# Patient Record
Sex: MALE | NOT HISPANIC OR LATINO | Employment: FULL TIME | URBAN - METROPOLITAN AREA
[De-identification: names, ages, dates, MRNs, and addresses within clinical notes are randomized per-mention and may not be internally consistent; named-entity substitution may affect disease eponyms.]

---

## 2019-11-15 ENCOUNTER — OFFICE VISIT (OUTPATIENT)
Dept: URGENT CARE | Facility: CLINIC | Age: 24
End: 2019-11-15
Payer: COMMERCIAL

## 2019-11-15 ENCOUNTER — HOSPITAL ENCOUNTER (EMERGENCY)
Facility: MEDICAL CENTER | Age: 24
End: 2019-11-15
Attending: EMERGENCY MEDICINE
Payer: COMMERCIAL

## 2019-11-15 ENCOUNTER — APPOINTMENT (OUTPATIENT)
Dept: RADIOLOGY | Facility: MEDICAL CENTER | Age: 24
End: 2019-11-15
Attending: EMERGENCY MEDICINE
Payer: COMMERCIAL

## 2019-11-15 VITALS
OXYGEN SATURATION: 97 % | WEIGHT: 141.76 LBS | BODY MASS INDEX: 21 KG/M2 | RESPIRATION RATE: 16 BRPM | TEMPERATURE: 97.2 F | HEART RATE: 89 BPM | SYSTOLIC BLOOD PRESSURE: 120 MMHG | HEIGHT: 69 IN | DIASTOLIC BLOOD PRESSURE: 80 MMHG

## 2019-11-15 VITALS
DIASTOLIC BLOOD PRESSURE: 64 MMHG | SYSTOLIC BLOOD PRESSURE: 110 MMHG | OXYGEN SATURATION: 95 % | TEMPERATURE: 98.3 F | WEIGHT: 132.7 LBS | HEART RATE: 93 BPM | BODY MASS INDEX: 19.65 KG/M2 | RESPIRATION RATE: 18 BRPM | HEIGHT: 69 IN

## 2019-11-15 DIAGNOSIS — S60.811A: ICD-10-CM

## 2019-11-15 DIAGNOSIS — K52.9 COLITIS: ICD-10-CM

## 2019-11-15 DIAGNOSIS — K62.5 RECTAL BLEEDING: ICD-10-CM

## 2019-11-15 DIAGNOSIS — R10.30 LOWER ABDOMINAL PAIN: ICD-10-CM

## 2019-11-15 DIAGNOSIS — Z23 NEED FOR TETANUS BOOSTER: ICD-10-CM

## 2019-11-15 LAB
ALBUMIN SERPL BCP-MCNC: 5.3 G/DL (ref 3.2–4.9)
ALBUMIN/GLOB SERPL: 1.8 G/DL
ALP SERPL-CCNC: 54 U/L (ref 30–99)
ALT SERPL-CCNC: 13 U/L (ref 2–50)
ANION GAP SERPL CALC-SCNC: 8 MMOL/L (ref 0–11.9)
APPEARANCE UR: CLEAR
AST SERPL-CCNC: 20 U/L (ref 12–45)
BASOPHILS # BLD AUTO: 0.8 % (ref 0–1.8)
BASOPHILS # BLD: 0.04 K/UL (ref 0–0.12)
BILIRUB SERPL-MCNC: 1.5 MG/DL (ref 0.1–1.5)
BILIRUB UR QL STRIP.AUTO: NEGATIVE
BUN SERPL-MCNC: 12 MG/DL (ref 8–22)
CALCIUM SERPL-MCNC: 10.2 MG/DL (ref 8.5–10.5)
CHLORIDE SERPL-SCNC: 103 MMOL/L (ref 96–112)
CO2 SERPL-SCNC: 28 MMOL/L (ref 20–33)
COLOR UR: YELLOW
CREAT SERPL-MCNC: 1.16 MG/DL (ref 0.5–1.4)
EOSINOPHIL # BLD AUTO: 0.11 K/UL (ref 0–0.51)
EOSINOPHIL NFR BLD: 2.3 % (ref 0–6.9)
ERYTHROCYTE [DISTWIDTH] IN BLOOD BY AUTOMATED COUNT: 42.3 FL (ref 35.9–50)
GLOBULIN SER CALC-MCNC: 2.9 G/DL (ref 1.9–3.5)
GLUCOSE SERPL-MCNC: 95 MG/DL (ref 65–99)
GLUCOSE UR STRIP.AUTO-MCNC: NEGATIVE MG/DL
HCT VFR BLD AUTO: 51.4 % (ref 42–52)
HGB BLD-MCNC: 17.5 G/DL (ref 14–18)
IMM GRANULOCYTES # BLD AUTO: 0.01 K/UL (ref 0–0.11)
IMM GRANULOCYTES NFR BLD AUTO: 0.2 % (ref 0–0.9)
KETONES UR STRIP.AUTO-MCNC: 15 MG/DL
LEUKOCYTE ESTERASE UR QL STRIP.AUTO: NEGATIVE
LIPASE SERPL-CCNC: 27 U/L (ref 11–82)
LYMPHOCYTES # BLD AUTO: 2.04 K/UL (ref 1–4.8)
LYMPHOCYTES NFR BLD: 42 % (ref 22–41)
MCH RBC QN AUTO: 29.1 PG (ref 27–33)
MCHC RBC AUTO-ENTMCNC: 34 G/DL (ref 33.7–35.3)
MCV RBC AUTO: 85.4 FL (ref 81.4–97.8)
MICRO URNS: ABNORMAL
MONOCYTES # BLD AUTO: 0.53 K/UL (ref 0–0.85)
MONOCYTES NFR BLD AUTO: 10.9 % (ref 0–13.4)
NEUTROPHILS # BLD AUTO: 2.13 K/UL (ref 1.82–7.42)
NEUTROPHILS NFR BLD: 43.8 % (ref 44–72)
NITRITE UR QL STRIP.AUTO: NEGATIVE
NRBC # BLD AUTO: 0 K/UL
NRBC BLD-RTO: 0 /100 WBC
PH UR STRIP.AUTO: 7 [PH] (ref 5–8)
PLATELET # BLD AUTO: 195 K/UL (ref 164–446)
PMV BLD AUTO: 11.2 FL (ref 9–12.9)
POTASSIUM SERPL-SCNC: 4 MMOL/L (ref 3.6–5.5)
PROT SERPL-MCNC: 8.2 G/DL (ref 6–8.2)
PROT UR QL STRIP: NEGATIVE MG/DL
RBC # BLD AUTO: 6.02 M/UL (ref 4.7–6.1)
RBC UR QL AUTO: NEGATIVE
SODIUM SERPL-SCNC: 139 MMOL/L (ref 135–145)
SP GR UR STRIP.AUTO: 1.02
UROBILINOGEN UR STRIP.AUTO-MCNC: 1 MG/DL
WBC # BLD AUTO: 4.9 K/UL (ref 4.8–10.8)

## 2019-11-15 PROCEDURE — 90715 TDAP VACCINE 7 YRS/> IM: CPT | Performed by: NURSE PRACTITIONER

## 2019-11-15 PROCEDURE — 80053 COMPREHEN METABOLIC PANEL: CPT

## 2019-11-15 PROCEDURE — 85025 COMPLETE CBC W/AUTO DIFF WBC: CPT

## 2019-11-15 PROCEDURE — 700117 HCHG RX CONTRAST REV CODE 255: Performed by: EMERGENCY MEDICINE

## 2019-11-15 PROCEDURE — 83690 ASSAY OF LIPASE: CPT

## 2019-11-15 PROCEDURE — 81003 URINALYSIS AUTO W/O SCOPE: CPT

## 2019-11-15 PROCEDURE — 99284 EMERGENCY DEPT VISIT MOD MDM: CPT

## 2019-11-15 PROCEDURE — 99204 OFFICE O/P NEW MOD 45 MIN: CPT | Mod: 25 | Performed by: NURSE PRACTITIONER

## 2019-11-15 PROCEDURE — 74177 CT ABD & PELVIS W/CONTRAST: CPT

## 2019-11-15 PROCEDURE — 90471 IMMUNIZATION ADMIN: CPT | Performed by: NURSE PRACTITIONER

## 2019-11-15 RX ORDER — HYDROCORTISONE ACETATE 25 MG/1
25 SUPPOSITORY RECTAL EVERY 12 HOURS
Qty: 6 SUPPOSITORY | Refills: 0 | Status: SHIPPED | OUTPATIENT
Start: 2019-11-15 | End: 2019-11-18

## 2019-11-15 RX ORDER — SULFAMETHOXAZOLE AND TRIMETHOPRIM 800; 160 MG/1; MG/1
1 TABLET ORAL 2 TIMES DAILY
Qty: 10 TAB | Refills: 0 | Status: SHIPPED | OUTPATIENT
Start: 2019-11-15 | End: 2019-11-20

## 2019-11-15 RX ADMIN — IOHEXOL 80 ML: 350 INJECTION, SOLUTION INTRAVENOUS at 20:48

## 2019-11-15 NOTE — LETTER
November 15, 2019         Patient: Carlo Adams   YOB: 1995   Date of Visit: 11/15/2019           To Whom it May Concern:    Carlo Adams was seen in my clinic on 11/15/2019. He may be excused from work today and tomorrow.     If you have any questions or concerns, please don't hesitate to call.        Sincerely,           NABEEL Espinosa.  Electronically Signed

## 2019-11-16 NOTE — ED PROVIDER NOTES
"ED Provider Note    Scribed for Jerardo Mendosa M.D. by Jerardo Mendosa. 11/15/2019,  8:06 PM.    CHIEF COMPLAINT  Chief Complaint   Patient presents with   • Bloody Stools     Pt reports blood in stool 2 days ago, dark/tarry in nature. pt explains hx of fissures   • Abdominal Pain       HPI  Carlo Adams is a 24 y.o. male who presents to the Emergency Department for recurrent bloody stools.  He says that he is had these symptoms intermittently since December, when he saw GI consultants, who recommended a colonoscopy.  He ended up having a colonoscopy in [facility], and he has a report with him, which showed no significant findings other than a small fissure.  He denies fevers or chills or nausea or vomiting, but there is a slightly different symptom today which is crampy lower abdominal pain.  He has not had fevers or chills, or constipation or diarrhea.  He does not abuse alcohol, is not on blood thinners, and has no history of easy bleeding or bruising.      REVIEW OF SYSTEMS  See HPI for further details. All other systems are negative.     PAST MEDICAL HISTORY   Anal fissure.    SOCIAL HISTORY  Social History     Tobacco Use   • Smoking status: Never Smoker   • Smokeless tobacco: Never Used   Substance and Sexual Activity   • Alcohol use: Not on file   • Drug use: Not on file   • Sexual activity: Not on file     Social History     Substance and Sexual Activity   Drug Use Not on file       SURGICAL HISTORY  patient denies any surgical history    CURRENT MEDICATIONS  Home Medications    **Home medications have not yet been reviewed for this encounter**         ALLERGIES  No Known Allergies    PHYSICAL EXAM  VITAL SIGNS: /80   Pulse 89   Temp 36.2 °C (97.2 °F)   Resp 16   Ht 1.753 m (5' 9\")   Wt 64.3 kg (141 lb 12.1 oz)   SpO2 97%   BMI 20.93 kg/m²   Pulse ox interpretation: I interpret this pulse ox as normal.  Constitutional: Alert in no apparent distress.  HENT: No signs of trauma, Bilateral " external ears normal, Nose normal.   Eyes: Conjunctiva normal, Non-icteric.   Neck: Normal range of motion, Supple, No stridor.   Lymphatic: No lymphadenopathy noted.   Cardiovascular: Regular rate and rhythm, no murmurs.   Thorax & Lungs: Normal breath sounds, No respiratory distress, No wheezing, No chest tenderness.   Abdomen: Bowel sounds normal, Soft, slight central lower abdominal tenderness, No masses, No pulsatile masses. No peritoneal signs.  Skin: Warm, Dry, No erythema, No rash.   Extremities: Intact distal pulses, No edema, No cyanosis.  Musculoskeletal: Good range of motion in all major joints. No or major deformities noted.   Neurologic: Alert , Normal motor function, Normal sensory function, No focal deficits noted.   Psychiatric: Affect normal, Judgment normal, Mood normal.     DIAGNOSTIC STUDIES / PROCEDURES      LABS  Labs Reviewed   CBC WITH DIFFERENTIAL - Abnormal; Notable for the following components:       Result Value    Neutrophils-Polys 43.80 (*)     Lymphocytes 42.00 (*)     All other components within normal limits   COMP METABOLIC PANEL - Abnormal; Notable for the following components:    Albumin 5.3 (*)     All other components within normal limits   URINALYSIS,CULTURE IF INDICATED - Abnormal; Notable for the following components:    Ketones 15 (*)     All other components within normal limits   LIPASE   ESTIMATED GFR     All labs reviewed by me.    RADIOLOGY  CT-ABDOMEN-PELVIS WITH   Final Result      1.  2.3 cm segment of focal mucosal enhancement and mucosal thickening in the upper rectum which may represent area of focal colitis.      2.  No evidence of pericolonic abscess or bowel obstruction. Progress no evidence of small bowel obstruction or inflammation.      3.  Otherwise negative CT examination.           The radiologist's interpretation of all radiological studies have been reviewed by me.    COURSE & MEDICAL DECISION MAKING  Nursing notes, VS, PMSFHx reviewed in chart.      8:06 PM Patient seen and examined at bedside.  His labs, ordered in triage per protocol, show hemoglobin and hematocrit on the high end of normal, without any evidence of significant blood loss.  We know that he has a history of an anal fissure, but his new symptom is lower abdominal pain.  Though he does not show any leukocytosis or abnormal vital signs consistent with sepsis, I think it is reasonable, given the new symptoms, to order a CT scan of his belly.      9:49 PM this patient CT scan shows a small area of focal mucosal enhancement and thickening in the upper rectum, which may be focal colitis.  Given the bloody stool, which may be an indicator of bacterial infection, think it is reasonable to treat him with antibiotics.  Additionally, given his history of anal fissure, and the inflammatory change in his rectum, I think some steroid suppositories may be helpful as well.  The patient and I discussed this at the bedside, and I recommended that he follow-up with the GI clinic he was seen at in December, and he was also given primary care clinic information and return precautions for the emergency department.     The patient will return for new or worsening symptoms and is stable at the time of discharge.    The patient is referred to a primary physician for blood pressure management, diabetic screening, and for all other preventative health concerns.        DISPOSITION:  Patient will be discharged home in stable condition.    FOLLOW UP:  [facility]  [address] [facility] [id number] [phone]  Schedule an appointment as soon as possible for a visit       [facility] [address]  [phone]    Schedule an appointment as soon as possible for a visit   for primary care    [address] [id number] [phone]    for primary care    [address] [address]  [phone]    for  primary care    [facility], Emergency Dept  [address] [facility]  [place] [id number] [phone]    If symptoms worsen      OUTPATIENT MEDICATIONS:  Discharge Medication List as of 11/15/2019  9:35 PM      START taking these medications    Details   sulfamethoxazole-trimethoprim (BACTRIM DS) 800-160 MG tablet Take 1 Tab by mouth 2 times a day for 5 days., Disp-10 Tab, R-0, Normal      hydrocortisone (ANUSOL-HC) 25 MG Suppos Insert 1 Suppository in rectum every 12 hours for 3 days., Disp-6 Suppository, R-0, Normal               FINAL IMPRESSION  1. Colitis

## 2019-11-16 NOTE — ED TRIAGE NOTES
Chief Complaint   Patient presents with   • Bloody Stools     Pt reports blood in stool 2 days ago, dark/tarry in nature. pt explains hx of fissures   • Abdominal Pain     Explained to pt triage process, made pt aware to tell this RN/staff of any changes/concerns, pt verbalized understanding of process and instructions given. Pt to ER prabhakar.

## 2019-11-16 NOTE — ED NOTES
Pt ambulatory to ortho room.  Agree with triage rn.  Pt states longer hx of bloody stools, increasing over last two days.

## 2019-11-16 NOTE — PROGRESS NOTES
Chief Complaint   Patient presents with   • GI Problem     x 2 day poopng blood, pts has been seen previously for same issue back in [facility] but patient did not cmplete all requested tests        HISTORY OF PRESENT ILLNESS: Patient is a 24 y.o. male who presents to urgent care today with complaints of rectal bleeding. Notes that for the past two days he has had bleeding from his rectum, needing several underwear changes. The bleeding is not directly related to bowel movements but is draining from rectum, dark and bright red. He admits to associated weakness, lower abdominal pain, and fatigue. Denies fever, diarrhea. Admits to similar episode in January in [place], he had a colonoscopy but cannot recall results. Furthermore he is requesting a tetanus booster. Notes he accidentally scraped his wrist yesterday, causing a scratch. He cannot recall his last booster.       There are no active problems to display for this patient.      Allergies:Patient has no known allergies.    No current [facility]-ordered outpatient medications on file.     No current [facility]-ordered facility-administered medications on file.        History reviewed. No pertinent past medical history.    Social History     Tobacco Use   • Smoking status: Never Smoker   • Smokeless tobacco: Never Used   Substance Use Topics   • Alcohol use: Not on file   • Drug use: Not on file       No family status information on file.   History reviewed. No pertinent family history.    ROS:  Review of Systems   Constitutional: Negative for fever, chills, weight loss, malaise, and fatigue.   HENT: Negative for ear pain, nosebleeds, congestion, sore throat and neck pain.    Eyes: Negative for vision changes.   Neuro: Negative for headache, sensory changes, weakness, seizure, LOC.   Cardiovascular: Negative for chest pain, palpitations, orthopnea and leg swelling.   Respiratory: Negative for cough, sputum production, shortness of breath and wheezing.   Gastrointestinal: Positive for  "abdominal pain, rectal bleeding. Negative for nausea, vomiting or diarrhea.   Genitourinary: Negative for dysuria, urgency and frequency.  Musculoskeletal: Negative for falls, neck pain, back pain, joint pain, myalgias.   Skin: Positive for scratch. Negative for rash, diaphoresis.     Exam:  /64   Pulse 93   Temp 36.8 °C (98.3 °F) (Temporal)   Resp 18   Ht 1.753 m (5' 9\")   Wt 60.2 kg (132 lb 11.2 oz)   SpO2 95%   General: well-nourished, well-developed male in NAD  Head: normocephalic, atraumatic  Eyes: PERRLA, no conjunctival injection, acuity grossly intact, lids normal.  Ears: normal shape and symmetry, no tenderness, no discharge. External canals are without any significant edema or erythema. Tympanic membranes are without any inflammation, no effusion. Gross auditory acuity is intact.  Nose: symmetrical without tenderness, no discharge.  Mouth/Throat: reasonable hygiene, no erythema, exudates or tonsillar enlargement.  Neck: no masses, range of motion within normal limits, no tracheal deviation. No obvious thyroid enlargement.   Lymph: no cervical adenopathy. No supraclavicular adenopathy.   Neuro: alert and oriented. Cranial nerves 1-12 grossly intact. No sensory deficit.   Cardiovascular: regular rate and rhythm. No edema.  Pulmonary: no distress. Chest is symmetrical with respiration, no wheezes, crackles, or rhonchi.   Abdomen: soft, RLQ and suprapubic tenderness, no guarding, no hepatosplenomegaly.  Musculoskeletal: no clubbing, appropriate muscle tone, gait is stable.  Skin: warm, no clubbing, no cyanosis, no rashes. 2inch linear, superficial scratch to right wrist, no bleeding, swelling, or erythema.  Psych: appropriate mood, affect, judgement.         Assessment/Plan:  1. Rectal bleeding     2. Lower abdominal pain     3. Scratch of wrist, right, initial encounter     4. Need for tetanus booster  Tdap =>8yo IM         The patient is a pleasant 25 y/o with complaints of rectal bleeding, " lower abdominal pain, and weakness. Differential diagnoses include but are not limited to: colitis, diverticulitis, fissure, abscess, malignancy, hemorrhoid. At this time, I feel the patient requires a higher level of care in the ED for closer monitoring, stat lab work and/or imaging for further evaluation. This has been discussed with the patient and he states agreement and understanding.  The patient is stable to leave POV at this time and will go directly to ED without delay. He will remain NPO. Regarding scratch, appears superficial, not requiring further interventions. His tetanus updated in clinic.           Please note that this dictation was created using voice recognition software. I have made every reasonable attempt to correct obvious errors, but I expect that there are errors of grammar and possibly content that I did not discover before finalizing the note.      NABEEL Espinosa.

## 2019-11-16 NOTE — ED NOTES
Pt ambulatory  Vital signs stable  Pt handed d/c paperwork with understanding stated  Pt states will follow up with PCP and GI  Pt prescriptions called to pharm and states safe way home

## 2019-11-16 NOTE — DISCHARGE INSTRUCTIONS
Your laboratory tests were normal.  There is no sign of any significant or dangerous blood loss.  Your CT scan showed some inflammation in your distal colon, which we will treat with antibiotics and suppositories.  Please call to schedule follow-up with your gastroenterologists, and use the primary care clinics listed here to establish a primary care doctor.  Return to the emergency department for worsening symptoms rather than gradual improvement.

## 2020-01-17 ENCOUNTER — OFFICE VISIT (OUTPATIENT)
Dept: URGENT CARE | Facility: CLINIC | Age: 25
End: 2020-01-17
Payer: COMMERCIAL

## 2020-01-17 VITALS
DIASTOLIC BLOOD PRESSURE: 62 MMHG | HEART RATE: 114 BPM | TEMPERATURE: 98.7 F | HEIGHT: 69 IN | WEIGHT: 142.6 LBS | BODY MASS INDEX: 21.12 KG/M2 | SYSTOLIC BLOOD PRESSURE: 108 MMHG | RESPIRATION RATE: 12 BRPM | OXYGEN SATURATION: 96 %

## 2020-01-17 DIAGNOSIS — Z02.89 ENCOUNTER TO OBTAIN EXCUSE FROM WORK: ICD-10-CM

## 2020-01-17 PROCEDURE — 99213 OFFICE O/P EST LOW 20 MIN: CPT | Performed by: PHYSICIAN ASSISTANT

## 2020-01-17 SDOH — HEALTH STABILITY: MENTAL HEALTH: HOW OFTEN DO YOU HAVE A DRINK CONTAINING ALCOHOL?: NEVER

## 2020-01-17 ASSESSMENT — ENCOUNTER SYMPTOMS
ABDOMINAL PAIN: 1
EYE REDNESS: 0
EYE DISCHARGE: 0
HEADACHES: 0
COUGH: 0
VOMITING: 0
FEVER: 0
NAUSEA: 0
SORE THROAT: 0
SHORTNESS OF BREATH: 0

## 2020-01-17 NOTE — LETTER
[facility]  [address] [place] [address]     January 17, 2020    Patient: Carlo Adams   YOB: 1995   Date of Visit: 1/17/2020       To Whom It May Concern:    Carlo Adams was seen and treated in our department on 1/17/2020.  He is cleared to return to work.     This letter does not excuse the patient's previous absence from work.     Sincerely,     Annemarie Capellan P.A.-C.

## 2020-01-18 NOTE — PROGRESS NOTES
Subjective:      Carlo Adams is a 24 y.o. male who presents with Other (Pt. was seen in 11-15-19 for Rectal bleeding and states he needs a note to return to work at [employer]. )        The patient presents to clinic requesting a return to work note.  The patient states that he was seen in clinic in the ED in November for rectal bleeding.  The patient states he was subsequently seen by a tele-doc provider, who provided the patient with a work note.  The patient states he has been off of work since mid December.  The patient is requesting a note to return to work.  The patient states his rectal bleeding is currently resolved.  The patient reports intermittent lower abdominal pain, worse after eating.  The patient denies diarrhea.  No constipation.  No nausea/vomiting.  No fever.  The patient states he has been taking fiber supplements for his symptoms.  No aggravating/alleviating factors.    Other   This is a new problem. The current episode started today. The problem occurs constantly. Associated symptoms include abdominal pain (The patient reports intermittent lower abdominal pain.). Pertinent negatives include no chest pain, congestion, coughing, fever, headaches, nausea, rash, sore throat or vomiting. Nothing aggravates the symptoms. Treatments tried: Fiber Supplements.     PMH:  has no past medical history on file.  MEDS:   Current Outpatient Medications:   •  hyoscyamine (LEVSIN) 0.125 MG SL Tab, Take 125 mcg by mouth every four hours as needed for Cramping., Disp: , Rfl:   ALLERGIES: No Known Allergies  SURGHX: No past surgical history on file.  SOCHX:  reports that he has never smoked. He has never used smokeless tobacco. He reports that he does not drink alcohol or use drugs.  FH: Family history was reviewed, no pertinent findings to report    Review of Systems   Constitutional: Negative for fever.   HENT: Negative for congestion, ear pain and sore throat.    Eyes: Negative for discharge and redness.  "  Respiratory: Negative for cough and shortness of breath.    Cardiovascular: Negative for chest pain and leg swelling.   Gastrointestinal: Positive for abdominal pain (The patient reports intermittent lower abdominal pain.). Negative for nausea and vomiting.   Musculoskeletal: Negative for joint pain.   Skin: Negative for rash.   Neurological: Negative for headaches.          Objective:     /62   Pulse (!) 114   Temp 37.1 °C (98.7 °F) (Temporal)   Resp 12   Ht 1.753 m (5' 9\")   Wt 64.7 kg (142 lb 9.6 oz)   SpO2 96%   BMI 21.06 kg/m²      Physical Exam  Constitutional:       Appearance: Normal appearance.   HENT:      Head: Normocephalic and atraumatic.      Right Ear: External ear normal.      Left Ear: External ear normal.      Nose: Nose normal.      Mouth/Throat:      Mouth: Mucous membranes are moist.      Pharynx: Oropharynx is clear. No posterior oropharyngeal erythema.   Eyes:      Extraocular Movements: Extraocular movements intact.      Conjunctiva/sclera: Conjunctivae normal.   Neck:      Musculoskeletal: Normal range of motion and neck supple.   Abdominal:      General: Bowel sounds are normal.      Palpations: Abdomen is soft.      Tenderness: There is tenderness (diffuse lower abdominal tenderness). There is no guarding or rebound.   Musculoskeletal: Normal range of motion.   Skin:     General: Skin is warm and dry.   Neurological:      Mental Status: He is alert and oriented to person, place, and time.                 Assessment/Plan:     1. Encounter to obtain excuse from work    The patient presents to clinic requesting a note to return to work.  The patient states he has been off of work since mid December secondary to intermittent lower abdominal pain and rectal bleeding.  The patient's rectal bleeding is currently resolved.  Provided the patient with a note to return to work.  Advised the patient that his work note would not excuse his previous absences, as we can only provide 3 days " ago a time in Urgent Care.  The patient verbalized understanding.  On physical exam, the patient had diffuse lower abdominal tenderness without guarding or rebound.  Recommend OTC medications and supportive care for symptomatic management.  Recommend the patient follow-up with his PCP.  Discussed return precautions with the patient, and he verbalized understanding.    Differential diagnoses, supportive care, and indications for immediate follow-up discussed with patient.   Instructed to return to clinic or nearest emergency department for any change in condition, further concerns, or worsening of symptoms.    Work note provided  OTC Tylenol or Motrin for fever/discomfort.  Drink plenty of fluids  Follow-up with PCP  Return to clinic or go to the ED if symptoms worsen or fail to improve, or if the patient should develop worsening/increasing/persistent abdominal pain, rectal bleeding, diarrhea, constipation, nausea/vomiting, decreased p.o. intake, fever/chills, and/or any concerning symptoms.    Discussed plan with the patient, and he agrees to the above.